# Patient Record
Sex: MALE | Race: OTHER | HISPANIC OR LATINO | URBAN - METROPOLITAN AREA
[De-identification: names, ages, dates, MRNs, and addresses within clinical notes are randomized per-mention and may not be internally consistent; named-entity substitution may affect disease eponyms.]

---

## 2024-01-11 ENCOUNTER — EMERGENCY (EMERGENCY)
Facility: HOSPITAL | Age: 24
LOS: 1 days | Discharge: HOME | End: 2024-01-11
Attending: EMERGENCY MEDICINE | Admitting: EMERGENCY MEDICINE
Payer: SELF-PAY

## 2024-01-11 VITALS
RESPIRATION RATE: 16 BRPM | HEART RATE: 82 BPM | OXYGEN SATURATION: 99 % | SYSTOLIC BLOOD PRESSURE: 110 MMHG | DIASTOLIC BLOOD PRESSURE: 72 MMHG | TEMPERATURE: 99 F

## 2024-01-11 LAB
ALBUMIN SERPL ELPH-MCNC: 4.2 G/DL — SIGNIFICANT CHANGE UP (ref 3.3–5)
ALBUMIN SERPL ELPH-MCNC: 4.2 G/DL — SIGNIFICANT CHANGE UP (ref 3.3–5)
ALP SERPL-CCNC: 125 U/L — HIGH (ref 40–120)
ALP SERPL-CCNC: 125 U/L — HIGH (ref 40–120)
ALT FLD-CCNC: 38 U/L — SIGNIFICANT CHANGE UP (ref 4–41)
ALT FLD-CCNC: 38 U/L — SIGNIFICANT CHANGE UP (ref 4–41)
ANION GAP SERPL CALC-SCNC: 12 MMOL/L — SIGNIFICANT CHANGE UP (ref 7–14)
ANION GAP SERPL CALC-SCNC: 12 MMOL/L — SIGNIFICANT CHANGE UP (ref 7–14)
AST SERPL-CCNC: 32 U/L — SIGNIFICANT CHANGE UP (ref 4–40)
AST SERPL-CCNC: 32 U/L — SIGNIFICANT CHANGE UP (ref 4–40)
BASOPHILS # BLD AUTO: 0 K/UL — SIGNIFICANT CHANGE UP (ref 0–0.2)
BASOPHILS # BLD AUTO: 0 K/UL — SIGNIFICANT CHANGE UP (ref 0–0.2)
BASOPHILS NFR BLD AUTO: 0 % — SIGNIFICANT CHANGE UP (ref 0–2)
BASOPHILS NFR BLD AUTO: 0 % — SIGNIFICANT CHANGE UP (ref 0–2)
BILIRUB SERPL-MCNC: 0.2 MG/DL — SIGNIFICANT CHANGE UP (ref 0.2–1.2)
BILIRUB SERPL-MCNC: 0.2 MG/DL — SIGNIFICANT CHANGE UP (ref 0.2–1.2)
BUN SERPL-MCNC: 10 MG/DL — SIGNIFICANT CHANGE UP (ref 7–23)
BUN SERPL-MCNC: 10 MG/DL — SIGNIFICANT CHANGE UP (ref 7–23)
CALCIUM SERPL-MCNC: 9.2 MG/DL — SIGNIFICANT CHANGE UP (ref 8.4–10.5)
CALCIUM SERPL-MCNC: 9.2 MG/DL — SIGNIFICANT CHANGE UP (ref 8.4–10.5)
CHLORIDE SERPL-SCNC: 99 MMOL/L — SIGNIFICANT CHANGE UP (ref 98–107)
CHLORIDE SERPL-SCNC: 99 MMOL/L — SIGNIFICANT CHANGE UP (ref 98–107)
CO2 SERPL-SCNC: 26 MMOL/L — SIGNIFICANT CHANGE UP (ref 22–31)
CO2 SERPL-SCNC: 26 MMOL/L — SIGNIFICANT CHANGE UP (ref 22–31)
CREAT SERPL-MCNC: 0.83 MG/DL — SIGNIFICANT CHANGE UP (ref 0.5–1.3)
CREAT SERPL-MCNC: 0.83 MG/DL — SIGNIFICANT CHANGE UP (ref 0.5–1.3)
EGFR: 126 ML/MIN/1.73M2 — SIGNIFICANT CHANGE UP
EGFR: 126 ML/MIN/1.73M2 — SIGNIFICANT CHANGE UP
EOSINOPHIL # BLD AUTO: 0 K/UL — SIGNIFICANT CHANGE UP (ref 0–0.5)
EOSINOPHIL # BLD AUTO: 0 K/UL — SIGNIFICANT CHANGE UP (ref 0–0.5)
EOSINOPHIL NFR BLD AUTO: 0 % — SIGNIFICANT CHANGE UP (ref 0–6)
EOSINOPHIL NFR BLD AUTO: 0 % — SIGNIFICANT CHANGE UP (ref 0–6)
GLUCOSE SERPL-MCNC: 96 MG/DL — SIGNIFICANT CHANGE UP (ref 70–99)
GLUCOSE SERPL-MCNC: 96 MG/DL — SIGNIFICANT CHANGE UP (ref 70–99)
HCT VFR BLD CALC: 44.5 % — SIGNIFICANT CHANGE UP (ref 39–50)
HCT VFR BLD CALC: 44.5 % — SIGNIFICANT CHANGE UP (ref 39–50)
HGB BLD-MCNC: 14.5 G/DL — SIGNIFICANT CHANGE UP (ref 13–17)
HGB BLD-MCNC: 14.5 G/DL — SIGNIFICANT CHANGE UP (ref 13–17)
HIV 1+2 AB+HIV1 P24 AG SERPL QL IA: SIGNIFICANT CHANGE UP
HIV 1+2 AB+HIV1 P24 AG SERPL QL IA: SIGNIFICANT CHANGE UP
IANC: 2.21 K/UL — SIGNIFICANT CHANGE UP (ref 1.8–7.4)
IANC: 2.21 K/UL — SIGNIFICANT CHANGE UP (ref 1.8–7.4)
LYMPHOCYTES # BLD AUTO: 0.83 K/UL — LOW (ref 1–3.3)
LYMPHOCYTES # BLD AUTO: 0.83 K/UL — LOW (ref 1–3.3)
LYMPHOCYTES # BLD AUTO: 18 % — SIGNIFICANT CHANGE UP (ref 13–44)
LYMPHOCYTES # BLD AUTO: 18 % — SIGNIFICANT CHANGE UP (ref 13–44)
MCHC RBC-ENTMCNC: 27.4 PG — SIGNIFICANT CHANGE UP (ref 27–34)
MCHC RBC-ENTMCNC: 27.4 PG — SIGNIFICANT CHANGE UP (ref 27–34)
MCHC RBC-ENTMCNC: 32.6 GM/DL — SIGNIFICANT CHANGE UP (ref 32–36)
MCHC RBC-ENTMCNC: 32.6 GM/DL — SIGNIFICANT CHANGE UP (ref 32–36)
MCV RBC AUTO: 84 FL — SIGNIFICANT CHANGE UP (ref 80–100)
MCV RBC AUTO: 84 FL — SIGNIFICANT CHANGE UP (ref 80–100)
MONOCYTES # BLD AUTO: 0.33 K/UL — SIGNIFICANT CHANGE UP (ref 0–0.9)
MONOCYTES # BLD AUTO: 0.33 K/UL — SIGNIFICANT CHANGE UP (ref 0–0.9)
MONOCYTES NFR BLD AUTO: 7.2 % — SIGNIFICANT CHANGE UP (ref 2–14)
MONOCYTES NFR BLD AUTO: 7.2 % — SIGNIFICANT CHANGE UP (ref 2–14)
NEUTROPHILS # BLD AUTO: 2.58 K/UL — SIGNIFICANT CHANGE UP (ref 1.8–7.4)
NEUTROPHILS # BLD AUTO: 2.58 K/UL — SIGNIFICANT CHANGE UP (ref 1.8–7.4)
NEUTROPHILS NFR BLD AUTO: 51.4 % — SIGNIFICANT CHANGE UP (ref 43–77)
NEUTROPHILS NFR BLD AUTO: 51.4 % — SIGNIFICANT CHANGE UP (ref 43–77)
PLATELET # BLD AUTO: 243 K/UL — SIGNIFICANT CHANGE UP (ref 150–400)
PLATELET # BLD AUTO: 243 K/UL — SIGNIFICANT CHANGE UP (ref 150–400)
POTASSIUM SERPL-MCNC: 3.9 MMOL/L — SIGNIFICANT CHANGE UP (ref 3.5–5.3)
POTASSIUM SERPL-MCNC: 3.9 MMOL/L — SIGNIFICANT CHANGE UP (ref 3.5–5.3)
POTASSIUM SERPL-SCNC: 3.9 MMOL/L — SIGNIFICANT CHANGE UP (ref 3.5–5.3)
POTASSIUM SERPL-SCNC: 3.9 MMOL/L — SIGNIFICANT CHANGE UP (ref 3.5–5.3)
PROT SERPL-MCNC: 7.9 G/DL — SIGNIFICANT CHANGE UP (ref 6–8.3)
PROT SERPL-MCNC: 7.9 G/DL — SIGNIFICANT CHANGE UP (ref 6–8.3)
RBC # BLD: 5.3 M/UL — SIGNIFICANT CHANGE UP (ref 4.2–5.8)
RBC # BLD: 5.3 M/UL — SIGNIFICANT CHANGE UP (ref 4.2–5.8)
RBC # FLD: 12.8 % — SIGNIFICANT CHANGE UP (ref 10.3–14.5)
RBC # FLD: 12.8 % — SIGNIFICANT CHANGE UP (ref 10.3–14.5)
SODIUM SERPL-SCNC: 137 MMOL/L — SIGNIFICANT CHANGE UP (ref 135–145)
SODIUM SERPL-SCNC: 137 MMOL/L — SIGNIFICANT CHANGE UP (ref 135–145)
WBC # BLD: 4.61 K/UL — SIGNIFICANT CHANGE UP (ref 3.8–10.5)
WBC # BLD: 4.61 K/UL — SIGNIFICANT CHANGE UP (ref 3.8–10.5)
WBC # FLD AUTO: 4.61 K/UL — SIGNIFICANT CHANGE UP (ref 3.8–10.5)
WBC # FLD AUTO: 4.61 K/UL — SIGNIFICANT CHANGE UP (ref 3.8–10.5)

## 2024-01-11 PROCEDURE — 99285 EMERGENCY DEPT VISIT HI MDM: CPT

## 2024-01-11 PROCEDURE — 99283 EMERGENCY DEPT VISIT LOW MDM: CPT | Mod: GC

## 2024-01-11 RX ORDER — VALACYCLOVIR 500 MG/1
1 TABLET, FILM COATED ORAL
Qty: 15 | Refills: 0
Start: 2024-01-11 | End: 2024-01-15

## 2024-01-11 RX ORDER — VALACYCLOVIR 500 MG/1
1000 TABLET, FILM COATED ORAL ONCE
Refills: 0 | Status: COMPLETED | OUTPATIENT
Start: 2024-01-11 | End: 2024-01-11

## 2024-01-11 RX ADMIN — VALACYCLOVIR 1000 MILLIGRAM(S): 500 TABLET, FILM COATED ORAL at 14:37

## 2024-01-11 NOTE — ED ADULT NURSE NOTE - NSFALLUNIVINTERV_ED_ALL_ED
Bed/Stretcher in lowest position, wheels locked, appropriate side rails in place/Call bell, personal items and telephone in reach/Instruct patient to call for assistance before getting out of bed/chair/stretcher/Non-slip footwear applied when patient is off stretcher/[place] to call system/Physically safe environment - no spills, clutter or unnecessary equipment/Purposeful proactive rounding/Room/bathroom lighting operational, light cord in reach Bed/Stretcher in lowest position, wheels locked, appropriate side rails in place/Call bell, personal items and telephone in reach/Instruct patient to call for assistance before getting out of bed/chair/stretcher/Non-slip footwear applied when patient is off stretcher/[place] to call system/Physically safe environment - no spills, clutter or unnecessary equipment/Purposeful proactive rounding/Room/bathroom lighting operational, light cord in reach

## 2024-01-11 NOTE — CONSULT NOTE ADULT - SUBJECTIVE AND OBJECTIVE BOX
Patient is a 23y old  Male who presents with a chief complaint of   HPI:  23M without no significant PMHx presents to ED with 3 days of worsening diffuse itchy rash associated with fever and chills, ID consulted for assistance.    Rash started in face, and started moving to arms and entire body  Born in [facility], moved [place], currently living in Migrant Tents at [facility]  Reports unvaccinated against MMR/Varicella  Sexually active to female, last sexual activity 1 year ago     prior hospital charts reviewed [ x ]  primary team notes reviewed [ x ]  other consultant notes reviewed [ x ]    PAST MEDICAL & SURGICAL HISTORY:  No pertinent past medical history      No significant past surgical history          Allergies  No Known Allergies    ANTIMICROBIALS (past 90 days)  MEDICATIONS  (STANDING):          MEDICATIONS  (STANDING):    SOCIAL HISTORY:   ETOH socially, no smoking or iVDU    FAMILY HISTORY: HTN    REVIEW OF SYSTEMS  [  ] ROS unobtainable because:    [x  ] All other systems negative except as noted below:	    Constitutional:  [x ] fever [ x] chills  [ ] weight loss  [ ] weakness  Skin:  [x ] rash [ ] phlebitis	  Eyes: [ ] icterus [ ] pain  [ ] discharge	  ENMT: [ ] sore throat  [ ] thrush [ ] ulcers [ ] exudates  Respiratory: [ ] dyspnea [ ] hemoptysis [ ] cough [ ] sputum	  Cardiovascular:  [ ] chest pain [ ] palpitations [ ] edema	  Gastrointestinal:  [ ] nausea [ ] vomiting [ ] diarrhea [ ] constipation [ ] pain	  Genitourinary:  [ ] dysuria [ ] frequency [ ] hematuria [ ] discharge [ ] flank pain  [ ] incontinence  Musculoskeletal:  [ ] myalgias [ ] arthralgias [ ] arthritis  [ ] back pain  Neurological:  [ ] headache [ ] seizures  [ ] confusion/altered mental status  Psychiatric:  [ ] anxiety [ ] depression	  Hematology/Lymphatics:  [ ] lymphadenopathy  Endocrine:  [ ] adrenal [ ] thyroid  Allergic/Immunologic:	 [ ] transplant [ ] seasonal    Vital Signs Last 24 Hrs  T(F): 98.6 (01-11-24 @ 11:21), Max: 98.6 (01-11-24 @ 11:21)  Vital Signs Last 24 Hrs  HR: 82 (01-11-24 @ 11:21) (82 - 82)  BP: 110/72 (01-11-24 @ 11:21) (110/72 - 110/72)  RR: 16 (01-11-24 @ 11:21)  SpO2: 99% (01-11-24 @ 11:21) (99% - 99%)  Wt(kg): --    PHYSICAL EXAM:  Physical Exam:  Constitutional:  well preserved, comfortable  Head/Eyes: no icterus  ENT:  supple, no cervical lymphadenopathy   LUNGS:  CTA  CVS:  regular rhythm, no murmur  Abd:  soft, non-tender; non-distended  Ext:  no edema  Vascular:  IV site no erythema tenderness or discharge  MSK:  joints without swelling  Neuro: AAO X 3, non- focal  Skin: diffuse vesicular rash with ulceration, different stages   Patient is a 23y old  Male who presents with a chief complaint of   HPI:  23M without no significant PMHx presents to ED with 3 days of worsening diffuse itchy rash associated with fever and chills, ID consulted for assistance.    Rash started in face, and started moving to arms and entire body  Born in [facility], [place], currently living in Migrant Tents at [facility]  Reports unvaccinated against MMR/Varicella  Sexually active to female, last sexual activity 1 year ago     prior hospital charts reviewed [ x ]  primary team notes reviewed [ x ]  other consultant notes reviewed [ x ]    PAST MEDICAL & SURGICAL HISTORY:  No pertinent past medical history      No significant past surgical history          Allergies  No Known Allergies    ANTIMICROBIALS (past 90 days)  MEDICATIONS  (STANDING):          MEDICATIONS  (STANDING):    SOCIAL HISTORY:   ETOH socially, no smoking or iVDU    FAMILY HISTORY: HTN    REVIEW OF SYSTEMS  [  ] ROS unobtainable because:    [x  ] All other systems negative except as noted below:	    Constitutional:  [x ] fever [ x] chills  [ ] weight loss  [ ] weakness  Skin:  [x ] rash [ ] phlebitis	  Eyes: [ ] icterus [ ] pain  [ ] discharge	  ENMT: [ ] sore throat  [ ] thrush [ ] ulcers [ ] exudates  Respiratory: [ ] dyspnea [ ] hemoptysis [ ] cough [ ] sputum	  Cardiovascular:  [ ] chest pain [ ] palpitations [ ] edema	  Gastrointestinal:  [ ] nausea [ ] vomiting [ ] diarrhea [ ] constipation [ ] pain	  Genitourinary:  [ ] dysuria [ ] frequency [ ] hematuria [ ] discharge [ ] flank pain  [ ] incontinence  Musculoskeletal:  [ ] myalgias [ ] arthralgias [ ] arthritis  [ ] back pain  Neurological:  [ ] headache [ ] seizures  [ ] confusion/altered mental status  Psychiatric:  [ ] anxiety [ ] depression	  Hematology/Lymphatics:  [ ] lymphadenopathy  Endocrine:  [ ] adrenal [ ] thyroid  Allergic/Immunologic:	 [ ] transplant [ ] seasonal    Vital Signs Last 24 Hrs  T(F): 98.6 (01-11-24 @ 11:21), Max: 98.6 (01-11-24 @ 11:21)  Vital Signs Last 24 Hrs  HR: 82 (01-11-24 @ 11:21) (82 - 82)  BP: 110/72 (01-11-24 @ 11:21) (110/72 - 110/72)  RR: 16 (01-11-24 @ 11:21)  SpO2: 99% (01-11-24 @ 11:21) (99% - 99%)  Wt(kg): --    PHYSICAL EXAM:  Physical Exam:  Constitutional:  well preserved, comfortable  Head/Eyes: no icterus  ENT:  supple, no cervical lymphadenopathy   LUNGS:  CTA  CVS:  regular rhythm, no murmur  Abd:  soft, non-tender; non-distended  Ext:  no edema  Vascular:  IV site no erythema tenderness or discharge  MSK:  joints without swelling  Neuro: AAO X 3, non- focal  Skin: diffuse vesicular rash with ulceration, different stages   Patient is a 23y old  Male who presents with a chief complaint of   HPI:  23M without no significant PMHx presents to ED with 3 days of worsening diffuse itchy rash associated with fever and chills, ID consulted for assistance.     [id number]  Rash started in face, and started moving to arms and entire body  Born in [facility], [place], currently living in Migrant Tents at [facility]  Reports unvaccinated against MMR/Varicella  Sexually active to female, last sexual activity 1 year ago     prior hospital charts reviewed [ x ]  primary team notes reviewed [ x ]  other consultant notes reviewed [ x ]    PAST MEDICAL & SURGICAL HISTORY:  No pertinent past medical history      No significant past surgical history          Allergies  No Known Allergies    ANTIMICROBIALS (past 90 days)  MEDICATIONS  (STANDING):          MEDICATIONS  (STANDING):    SOCIAL HISTORY:   ETOH socially, no smoking or iVDU    FAMILY HISTORY: HTN    REVIEW OF SYSTEMS  [  ] ROS unobtainable because:    [x  ] All other systems negative except as noted below:	    Constitutional:  [x ] fever [ x] chills  [ ] weight loss  [ ] weakness  Skin:  [x ] rash [ ] phlebitis	  Eyes: [ ] icterus [ ] pain  [ ] discharge	  ENMT: [ ] sore throat  [ ] thrush [ ] ulcers [ ] exudates  Respiratory: [ ] dyspnea [ ] hemoptysis [ ] cough [ ] sputum	  Cardiovascular:  [ ] chest pain [ ] palpitations [ ] edema	  Gastrointestinal:  [ ] nausea [ ] vomiting [ ] diarrhea [ ] constipation [ ] pain	  Genitourinary:  [ ] dysuria [ ] frequency [ ] hematuria [ ] discharge [ ] flank pain  [ ] incontinence  Musculoskeletal:  [ ] myalgias [ ] arthralgias [ ] arthritis  [ ] back pain  Neurological:  [ ] headache [ ] seizures  [ ] confusion/altered mental status  Psychiatric:  [ ] anxiety [ ] depression	  Hematology/Lymphatics:  [ ] lymphadenopathy  Endocrine:  [ ] adrenal [ ] thyroid  Allergic/Immunologic:	 [ ] transplant [ ] seasonal    Vital Signs Last 24 Hrs  T(F): 98.6 (01-11-24 @ 11:21), Max: 98.6 (01-11-24 @ 11:21)  Vital Signs Last 24 Hrs  HR: 82 (01-11-24 @ 11:21) (82 - 82)  BP: 110/72 (01-11-24 @ 11:21) (110/72 - 110/72)  RR: 16 (01-11-24 @ 11:21)  SpO2: 99% (01-11-24 @ 11:21) (99% - 99%)  Wt(kg): --    PHYSICAL EXAM:  Physical Exam:  Constitutional:  well preserved, comfortable  Head/Eyes: no icterus  ENT:  supple, no cervical lymphadenopathy   LUNGS:  CTA  CVS:  regular rhythm, no murmur  Abd:  soft, non-tender; non-distended  Ext:  no edema  Vascular:  IV site no erythema tenderness or discharge  MSK:  joints without swelling  Neuro: AAO X 3, non- focal  Skin: diffuse vesicular rash with ulceration, different stages   Patient is a 23y old  Male who presents with a chief complaint of   HPI:  23M without no significant PMHx presents to ED with 3 days of worsening diffuse itchy rash associated with fever and chills, ID consulted for assistance.     [id number]  Rash started in face, and started moving to arms and entire body  Born in [facility], [place], currently living in Migrant Tents at [facility]  Reports unvaccinated against MMR/Varicella  Sexually active to female, last sexual activity 1 year ago     prior hospital charts reviewed [ x ]  primary team notes reviewed [ x ]  other consultant notes reviewed [ x ]    PAST MEDICAL & SURGICAL HISTORY:  No pertinent past medical history      No significant past surgical history          Allergies  No Known Allergies    ANTIMICROBIALS (past 90 days)  MEDICATIONS  (STANDING):          MEDICATIONS  (STANDING):    SOCIAL HISTORY:   ETOH socially, no smoking or iVDU    FAMILY HISTORY: HTN    REVIEW OF SYSTEMS  [  ] ROS unobtainable because:    [x  ] All other systems negative except as noted below:	    Constitutional:  [x ] fever [ x] chills  [ ] weight loss  [ ] weakness  Skin:  [x ] rash [ ] phlebitis	  Eyes: [ ] icterus [ ] pain  [ ] discharge	  ENMT: [ ] sore throat  [ ] thrush [ ] ulcers [ ] exudates  Respiratory: [ ] dyspnea [ ] hemoptysis [ ] cough [ ] sputum	  Cardiovascular:  [ ] chest pain [ ] palpitations [ ] edema	  Gastrointestinal:  [ ] nausea [ ] vomiting [ ] diarrhea [ ] constipation [ ] pain	  Genitourinary:  [ ] dysuria [ ] frequency [ ] hematuria [ ] discharge [ ] flank pain  [ ] incontinence  Musculoskeletal:  [ ] myalgias [ ] arthralgias [ ] arthritis  [ ] back pain  Neurological:  [ ] headache [ ] seizures  [ ] confusion/altered mental status  Psychiatric:  [ ] anxiety [ ] depression	  Hematology/Lymphatics:  [ ] lymphadenopathy  Endocrine:  [ ] adrenal [ ] thyroid  Allergic/Immunologic:	 [ ] transplant [ ] seasonal    Vital Signs Last 24 Hrs  T(F): 98.6 (01-11-24 @ 11:21), Max: 98.6 (01-11-24 @ 11:21)  Vital Signs Last 24 Hrs  HR: 82 (01-11-24 @ 11:21) (82 - 82)  BP: 110/72 (01-11-24 @ 11:21) (110/72 - 110/72)  RR: 16 (01-11-24 @ 11:21)  SpO2: 99% (01-11-24 @ 11:21) (99% - 99%)  Wt(kg): --    PHYSICAL EXAM:  Physical Exam:  Constitutional:  well preserved, comfortable  Head/Eyes: no icterus  ENT:  supple, no cervical lymphadenopathy   LUNGS:  CTA  CVS:  regular rhythm, no murmur  Abd:  soft, non-tender; non-distended  Ext:  no edema  Vascular:  IV site no erythema tenderness or discharge  MSK:  joints without swelling  Neuro: AAO X 3, non- focal  Skin: diffuse vesicular rash with ulceration, different stages    [id number]    Patient is a 23y old  Male who presents with a chief complaint of rash    HPI:  23M without no significant PMHx presents to ED with 3 days of worsening diffuse itchy rash associated with fever and chills.  Rash started in face, and started moving to arms and entire body.  Born in [facility], moved to  in 12/2023, currently living in Migrant Tents at [facility] [employer]  Reports unvaccinated against MMR/Varicella  Sexually active to female, last sexual activity 1 year ago     prior hospital charts reviewed [ x ]  primary team notes reviewed [ x ]  other consultant notes reviewed [ x ]    PAST MEDICAL & SURGICAL HISTORY:  No pertinent past medical history  No significant past surgical history    Allergies  No Known Allergies    ANTIMICROBIALS):    MEDICATIONS  (STANDING):    SOCIAL HISTORY:   ETOH socially, no smoking or IVDU, Veneela    FAMILY HISTORY: HTN    REVIEW OF SYSTEMS  [  ] ROS unobtainable because:    [x ] All other systems negative except as noted below:	    Constitutional:  [x ] fever [ x] chills  [ ] weight loss  [ ] weakness  Skin:  [x ] rash [ ] phlebitis	  Eyes: [ ] icterus [ ] pain  [ ] discharge	  ENMT: [ ] sore throat  [ ] thrush [ ] ulcers [ ] exudates  Respiratory: [ ] dyspnea [ ] hemoptysis [ ] cough [ ] sputum	  Cardiovascular:  [ ] chest pain [ ] palpitations [ ] edema	  Gastrointestinal:  [ ] nausea [ ] vomiting [ ] diarrhea [ ] constipation [ ] pain	  Genitourinary:  [ ] dysuria [ ] frequency [ ] hematuria [ ] discharge [ ] flank pain  [ ] incontinence  Musculoskeletal:  [ ] myalgias [ ] arthralgias [ ] arthritis  [ ] back pain  Neurological:  [ ] headache [ ] seizures  [ ] confusion/altered mental status  Psychiatric:  [ ] anxiety [ ] depression	  Hematology/Lymphatics:  [ ] lymphadenopathy  Endocrine:  [ ] adrenal [ ] thyroid  Allergic/Immunologic:	 [ ] transplant [ ] seasonal    Vital Signs Last 24 Hrs  T(F): 98.6 (01-11-24 @ 11:21), Max: 98.6 (01-11-24 @ 11:21)  Vital Signs Last 24 Hrs  HR: 82 (01-11-24 @ 11:21) (82 - 82)  BP: 110/72 (01-11-24 @ 11:21) (110/72 - 110/72)  RR: 16 (01-11-24 @ 11:21)  SpO2: 99% (01-11-24 @ 11:21) (99% - 99%)  Wt(kg): --    PHYSICAL EXAM:  Physical Exam:  Constitutional:  well preserved, comfortable  Head/Eyes: no icterus  ENT:  supple, no cervical lymphadenopathy   LUNGS:  CTA  CVS:  regular rhythm, no murmur  Abd:  soft, non-tender; non-distended  Ext:  no edema  Vascular:  IV site no erythema tenderness or discharge  MSK:  joints without swelling  Neuro: AAO X 3, non- focal  Skin: diffuse vesicular rash with ulceration, different stages                        14.5   4.61  )-----------( 243      ( 11 Jan 2024 12:15 )             44.5 01-11    137  |  99  |  10  ----------------------------<  96  3.9   |  26  |  0.83  Ca    9.2      11 Jan 2024 12:15  TPro  7.9  /  Alb  4.2  /  TBili  0.2  /  DBili  x   /  AST  32  /  ALT  38  /  AlkPhos  125  01-11    MICROBIOLOGY:  VZV PCR pending  HIV pending    RADIOLOGY:  none  [id number]    Patient is a 23y old  Male who presents with a chief complaint of rash    HPI:  23M without no significant PMHx presents to ED with 3 days of worsening diffuse itchy rash associated with fever and chills.  Rash started in face, and started moving to arms and entire body.  Born in [facility], moved to  in 12/2023, currently living in Migrant Tents at [email or web address]  Reports unvaccinated against MMR/Varicella  Sexually active to female, last sexual activity 1 year ago     prior hospital charts reviewed [ x ]  primary team notes reviewed [ x ]  other consultant notes reviewed [ x ]    PAST MEDICAL & SURGICAL HISTORY:  No pertinent past medical history  No significant past surgical history    Allergies  No Known Allergies    ANTIMICROBIALS):    MEDICATIONS  (STANDING):    SOCIAL HISTORY:   ETOH socially, no smoking or IVDU, Veneela    FAMILY HISTORY: HTN    REVIEW OF SYSTEMS  [  ] ROS unobtainable because:    [x ] All other systems negative except as noted below:	    Constitutional:  [x ] fever [ x] chills  [ ] weight loss  [ ] weakness  Skin:  [x ] rash [ ] phlebitis	  Eyes: [ ] icterus [ ] pain  [ ] discharge	  ENMT: [ ] sore throat  [ ] thrush [ ] ulcers [ ] exudates  Respiratory: [ ] dyspnea [ ] hemoptysis [ ] cough [ ] sputum	  Cardiovascular:  [ ] chest pain [ ] palpitations [ ] edema	  Gastrointestinal:  [ ] nausea [ ] vomiting [ ] diarrhea [ ] constipation [ ] pain	  Genitourinary:  [ ] dysuria [ ] frequency [ ] hematuria [ ] discharge [ ] flank pain  [ ] incontinence  Musculoskeletal:  [ ] myalgias [ ] arthralgias [ ] arthritis  [ ] back pain  Neurological:  [ ] headache [ ] seizures  [ ] confusion/altered mental status  Psychiatric:  [ ] anxiety [ ] depression	  Hematology/Lymphatics:  [ ] lymphadenopathy  Endocrine:  [ ] adrenal [ ] thyroid  Allergic/Immunologic:	 [ ] transplant [ ] seasonal    Vital Signs Last 24 Hrs  T(F): 98.6 (01-11-24 @ 11:21), Max: 98.6 (01-11-24 @ 11:21)  Vital Signs Last 24 Hrs  HR: 82 (01-11-24 @ 11:21) (82 - 82)  BP: 110/72 (01-11-24 @ 11:21) (110/72 - 110/72)  RR: 16 (01-11-24 @ 11:21)  SpO2: 99% (01-11-24 @ 11:21) (99% - 99%)  Wt(kg): --    PHYSICAL EXAM:  Physical Exam:  Constitutional:  well preserved, comfortable  Head/Eyes: no icterus  ENT:  supple, no cervical lymphadenopathy   LUNGS:  CTA  CVS:  regular rhythm, no murmur  Abd:  soft, non-tender; non-distended  Ext:  no edema  Vascular:  IV site no erythema tenderness or discharge  MSK:  joints without swelling  Neuro: AAO X 3, non- focal  Skin: diffuse vesicular rash with ulceration, different stages                        14.5   4.61  )-----------( 243      ( 11 Jan 2024 12:15 )             44.5 01-11    137  |  99  |  10  ----------------------------<  96  3.9   |  26  |  0.83  Ca    9.2      11 Jan 2024 12:15  TPro  7.9  /  Alb  4.2  /  TBili  0.2  /  DBili  x   /  AST  32  /  ALT  38  /  AlkPhos  125  01-11    MICROBIOLOGY:  VZV PCR pending  HIV pending    RADIOLOGY:  none

## 2024-01-11 NOTE — ED PROVIDER NOTE - OBJECTIVE STATEMENT
rash, itchy x 3 days.  Pt is a migrant from [facility], arrived in December and staying at the migrant tent at [facility] [employer].  Feels subjective fever, itchy scalp, legs and torso and no , oral or hand or foot involvement.  No cough, cp or sob rash, itchy x 3 days.  Pt is a migrant from [facility], arrived in December and staying at the migrant tent at [facility] [employer].  Feels subjective fever, itchy scalp, legs and torso and no , oral or hand or foot involvement.  No cough, cp or sob

## 2024-01-11 NOTE — CHART NOTE - NSCHARTNOTEFT_GEN_A_CORE
Per MD Winters, pt is on contact and airborne precautions due to chicken pox. Pt currently residing at migrant shelter at [place]. SW contacted shelter ([phone]) to arrange appropriate DC. SW was transferred to supervisor Mckinley (p:[phone]) who arranged for temporary transfer to isolation [facility] in [place] ([address] [place] [address]). TULIO discussed appropriate transport as well as recommendation is for ambulance due to isolation. Mckinley shared that pt must transport in a taxi with mask and they confirmed they will alert the  of isolation precautions. Medical team okay with plan and will provide education to patient about PPE requirements during transport.     Pt will require medication for DC. Meds filled at [facility] for indigent funding. Mckinley arranged transportation to isolation shelter for 4pm pickup. No other SW needs identified at this time. Per MD Winters, pt is on contact and airborne precautions due to chicken pox. Pt currently residing at migrant shelter at [place]. SW contacted shelter ([phone]) to arrange appropriate DC. SW was transferred to supervisor Mckinley (p:[phone]) who arranged for temporary transfer to isolation [facility] in [place] ([address] [place] [address]). TULIO discussed appropriate transport as well as recommendation is for ambulance due to isolation. Mckinley shared that pt must transport in a taxi with mask and they confirmed they will alert the  of isolation precautions. Medical team okay with plan and will provide education to patient about PPE requirements during transport.     Pt will require medication for DC. Meds filled at [facility] for indigent funding. Mckinley arranged transportation to isolation shelter for 4pm pickup. No other SW needs identified at this time.

## 2024-01-11 NOTE — ED PROVIDER NOTE - PATIENT PORTAL LINK FT
You can access the FollowMyHealth Patient Portal offered by [facility] by registering at the following website: [email or web address]. By joining [employer]’s FollowMyHealth portal, you will also be able to view your health information using other applications (apps) compatible with our system. You can access the FollowMyHealth Patient Portal offered by [facility] by registering at the following website: [email or web address]. By joining [email or web address]’s FollowMyHealth portal, you will also be able to view your health information using other applications (apps) compatible with our system.

## 2024-01-11 NOTE — CONSULT NOTE ADULT - ATTENDING COMMENTS
23M presented to the ER with a 3 day history of rash.  Diffusely pruritic involving face, torso, abdomen, extremities.  From [facility] to  Dec 2023. 23M presented to the ER with a 3 day history of rash.  Diffusely pruritic involving face, torso, abdomen, extremities.  From [facility] to  Dec 2023.  phone - [phone]    23M presented to the ER with a 3 day history of rash.  Diffusely pruritic involving face, torso, abdomen, extremities.  From [facility] to  Dec 2023.  Living in the migrant tent at [facility].  Sharing an area with multiple people  No cough/sob.  No headache.  No GI sx. On exam, rash mostly crusted.  Several will vesicular.  No evidence of superinfection.  From an ID standpoint, if patient can self isolate and where a mask, he may be able to go back but would confirm with infection prevention.  phone - [phone]    23M presented to the ER with a 3 day history of rash.  Diffusely pruritic involving face, torso, abdomen, extremities.  From [facility] to  Dec 2023.  Living in the migrant tent at [facility].  Sharing an area with multiple people  No cough/sob.  No headache.  No GI sx. On exam, rash mostly crusted.  Several will vesicular.  No evidence of superinfection.  From an ID standpoint, if patient can self isolate and where a mask, he may be able to go back but would confirm with infection prevention.

## 2024-01-11 NOTE — ED ADULT NURSE NOTE - OBJECTIVE STATEMENT
Patient received in stretcher. AOX4. Respirations even and unlabored.  Spontaneous movement of all extremities noted. Presents to ER c/o generalized body rash x 3 days. Patient reports fevers chills body aches States " im so itchy" Open pustules some scabbed/ blistered over. Isolation precautions in place. IV placed. Labs drawn. Comfort and safety maintained. All current care needs met. Care plan continued Yani MOLINA

## 2024-01-11 NOTE — ED PROVIDER NOTE - CLINICAL SUMMARY MEDICAL DECISION MAKING FREE TEXT BOX
Pt with rash, itchy, possible varicella, less likely bed bugs, measles or std/     Pt impacted by language barrier and is a migrant and by social determinants of health.    Will get labs and ID consult, and SW- pt may not be able to go back to the migrant tent

## 2024-01-11 NOTE — ED PROVIDER NOTE - NSFOLLOWUPINSTRUCTIONS_ED_ALL_ED_FT
Valcyclovir 1 gram every 8 hours for 5 days.    WEar a mask at all times and stay isolated- do not touch other people.    If fever, red streaks, shortness of breath, headache, or worse symptoms return to the ER.    You can take over the counter benadryl 25 mg every 8 hours as needed for itching- do not drink alcohol or drive.

## 2024-01-11 NOTE — CONSULT NOTE ADULT - ASSESSMENT
23M without no significant PMHx presents to ED with 3 days of worsening diffuse itchy rash associated with fever and chills, ID consulted for assistance.    Rash started in face, and started moving to arms and entire body  Born in [facility], moved to [place] in 12/2023, currently living in [facility] at [facility]  Reports unvaccinated against MMR/Varicella  Sexually active to female, last sexual activity 1 year ago     IMPRESSION:  #Diffuse Versicular Rash, likely Primary Varicella     SUGGESTIONS:  - start Valacyclovir 1G TID  - monitor temperature curve  - trend WBC  - isolation per protocol  - obtain HIV screen  - obtain VZV/HSV lesion PCR swab  - obtain Varicella IgM/IgG      Above recommendations are Preliminary until Attending's Addendum which includes Final Recommendations      Lambert Pinto DO, PGY-5   ID fellow  [employer] Teams Preferred  After 5pm/weekends call [phone]   23M without no significant PMHx presents to ED with 3 days of worsening diffuse itchy rash associated with fever and chills, ID consulted for assistance.    Rash started in face, and started moving to arms and entire body  Born in [facility], moved to [place] in 12/2023, currently living in [facility] at [facility]  Reports unvaccinated against MMR/Varicella  Sexually active to female, last sexual activity 1 year ago     IMPRESSION:  #Diffuse Versicular Rash, likely Primary Varicella     SUGGESTIONS:  - start Valacyclovir 1G TID  - monitor temperature curve  - trend WBC  - isolation per protocol  - obtain HIV screen  - obtain VZV/HSV lesion PCR swab  - obtain Varicella IgM/IgG      Above recommendations are Preliminary until Attending's Addendum which includes Final Recommendations      Lambert Pinto DO, PGY-5   ID fellow  [employer] Teams Preferred  After 5pm/weekends call [phone]   23M without no significant PMHx presents to ED with 3 days of worsening diffuse itchy rash associated with fever and chills, ID consulted for assistance.    Rash started in face, and started moving to arms and entire body  Born in [facility], moved to [place] in 12/2023, currently living in [facility] at [employer]  Reports unvaccinated against MMR/Varicella  Sexually active to female, last sexual activity 1 year ago     IMPRESSION:  #Diffuse Versicular Rash, likely Primary Varicella     SUGGESTIONS:  - start Valacyclovir 1G TID for 5-7 days (already started to crust over)  - monitor temperature curve  - trend WBC  - isolation per protocol  - follow up VZV/HSV lesion PCR swab (i sent)  - patient with housing situation, would reach out to Social Work regarding ability to isolate appropriately prior to discharge      Case d/w attending and primary team        Lambert Pinto DO, PGY-5   ID fellow  [employer] Teams Preferred  After 5pm/weekends call [phone]   23M without no significant PMHx presents to ED with 3 days of worsening diffuse itchy rash associated with fever and chills, ID consulted for assistance.    Rash started in face, and started moving to arms and entire body  Born in [facility], moved to [place] in [place], currently living in [facility]ents at [facility]  Reports unvaccinated against MMR/Varicella  Sexually active to female, last sexual activity 1 year ago     IMPRESSION:  #Diffuse Versicular Rash, likely Primary Varicella     SUGGESTIONS:  - start Valacyclovir 1G TID for 5-7 days (already started to crust over)  - monitor temperature curve  - trend WBC  - isolation per protocol  - follow up VZV/HSV lesion PCR swab (i sent)  - patient with housing situation, would reach out to Social Work regarding ability to isolate appropriately prior to discharge      Case d/w attending and primary team        Lambert Pinto DO, PGY-5   ID fellow  [employer] Teams Preferred  After 5pm/weekends call [phone]

## 2024-01-11 NOTE — ED PROVIDER NOTE - HIV OFFER
-- DO NOT REPLY / DO NOT REPLY ALL --  -- Message is from the [facility]--    COVID-19 Universal Screening: N/A - Not about scheduling    General Patient Message      Reason for Call: Pt needs US Thyroid to be Faxed over  To ENT office   Fax # [phone]    Caller Information       Type Contact Phone    06/02/2021 03:54 PM CDT Phone (Incoming) JOSEPHINE  (Spouse) [phone]          Alternative phone number: none     Turnaround time given to caller:   \"This message will be sent to [state Provider's name]. The clinical team will fulfill your request as soon as they review your message.\"    
Yes, get tested

## 2024-01-12 LAB
HSV+VZV DNA SPEC QL NAA+PROBE: ABNORMAL
HSV+VZV DNA SPEC QL NAA+PROBE: ABNORMAL
MEV IGG SER-ACNC: 57.1 AU/ML — SIGNIFICANT CHANGE UP
MEV IGG SER-ACNC: 57.1 AU/ML — SIGNIFICANT CHANGE UP
MEV IGG+IGM SER-IMP: POSITIVE — SIGNIFICANT CHANGE UP
MEV IGG+IGM SER-IMP: POSITIVE — SIGNIFICANT CHANGE UP
SPECIMEN SOURCE: SIGNIFICANT CHANGE UP
SPECIMEN SOURCE: SIGNIFICANT CHANGE UP
T PALLIDUM AB TITR SER: NEGATIVE — SIGNIFICANT CHANGE UP
T PALLIDUM AB TITR SER: NEGATIVE — SIGNIFICANT CHANGE UP
VZV IGG FLD QL IA: 103 INDEX — SIGNIFICANT CHANGE UP
VZV IGG FLD QL IA: 103 INDEX — SIGNIFICANT CHANGE UP
VZV IGG FLD QL IA: NEGATIVE — SIGNIFICANT CHANGE UP
VZV IGG FLD QL IA: NEGATIVE — SIGNIFICANT CHANGE UP
VZV IGM SER-ACNC: 1.77 INDEX — HIGH (ref 0–0.9)
VZV IGM SER-ACNC: 1.77 INDEX — HIGH (ref 0–0.9)

## 2024-01-12 NOTE — ED POST DISCHARGE NOTE - REASON FOR FOLLOW-UP
Received call from lab: Herpes Simplex Virus detected. Patient discharge diagnosis Varicella zoster and prescribed Valacyclovir.  As per ED provider note, patient was arranged transfer to temporary isolation shelter in [place].  Tried contacting patient [phone].  Unable to leave message. Received call from lab: Herpes Simplex Virus detected. Patient discharge diagnosis Varicella zoster and prescribed Valacyclovir.  As per ED provider note, patient was arranged transfer to temporary isolation shelter in [place].  Tried contacting patient [phone].  Unable to leave message. Other

## 2024-01-16 LAB — MEV IGM SER-ACNC: 0.25 AU — SIGNIFICANT CHANGE UP (ref 0–0.79)
